# Patient Record
Sex: MALE | Race: BLACK OR AFRICAN AMERICAN | NOT HISPANIC OR LATINO | Employment: PART TIME | URBAN - METROPOLITAN AREA
[De-identification: names, ages, dates, MRNs, and addresses within clinical notes are randomized per-mention and may not be internally consistent; named-entity substitution may affect disease eponyms.]

---

## 2019-06-23 ENCOUNTER — HOSPITAL ENCOUNTER (EMERGENCY)
Facility: HOSPITAL | Age: 24
Discharge: HOME | End: 2019-06-23
Attending: EMERGENCY MEDICINE
Payer: MEDICAID

## 2019-06-23 VITALS
DIASTOLIC BLOOD PRESSURE: 67 MMHG | WEIGHT: 140 LBS | OXYGEN SATURATION: 98 % | TEMPERATURE: 98 F | HEART RATE: 96 BPM | SYSTOLIC BLOOD PRESSURE: 146 MMHG | BODY MASS INDEX: 24.8 KG/M2 | HEIGHT: 63 IN | RESPIRATION RATE: 16 BRPM

## 2019-06-23 DIAGNOSIS — M25.473 PAIN AND SWELLING OF ANKLE: ICD-10-CM

## 2019-06-23 DIAGNOSIS — M25.579 PAIN AND SWELLING OF ANKLE: ICD-10-CM

## 2019-06-23 DIAGNOSIS — S82.65XA CLOSED NONDISPLACED FRACTURE OF LATERAL MALLEOLUS OF LEFT FIBULA, INITIAL ENCOUNTER: Primary | ICD-10-CM

## 2019-06-23 PROCEDURE — 99284 EMERGENCY DEPT VISIT MOD MDM: CPT | Mod: ,,, | Performed by: PHYSICIAN ASSISTANT

## 2019-06-23 PROCEDURE — 99284 PR EMERGENCY DEPT VISIT,LEVEL IV: ICD-10-PCS | Mod: ,,, | Performed by: PHYSICIAN ASSISTANT

## 2019-06-23 PROCEDURE — 99284 EMERGENCY DEPT VISIT MOD MDM: CPT | Mod: 25

## 2019-06-23 PROCEDURE — 25000003 PHARM REV CODE 250: Performed by: PHYSICIAN ASSISTANT

## 2019-06-23 RX ORDER — ACETAMINOPHEN 500 MG
1000 TABLET ORAL
Status: COMPLETED | OUTPATIENT
Start: 2019-06-23 | End: 2019-06-23

## 2019-06-23 RX ORDER — HYDROCODONE BITARTRATE AND ACETAMINOPHEN 5; 325 MG/1; MG/1
1 TABLET ORAL EVERY 4 HOURS PRN
Qty: 12 TABLET | Refills: 0 | Status: SHIPPED | OUTPATIENT
Start: 2019-06-23

## 2019-06-23 RX ADMIN — ACETAMINOPHEN 1000 MG: 500 TABLET ORAL at 06:06

## 2019-06-23 NOTE — ED NOTES
"Patient twisted left ankle while doing trick on [employer] on Wednesday. Patient reports wrapping ankle and taking ibuprofen, with improvement of swelling, but patient reports job is requesting "clearance" from doctor.   "

## 2019-06-23 NOTE — ED NOTES
LOC: The patient is awake, alert and aware of environment with an appropriate affect, the patient is oriented x 3 and speaking appropriately.  APPEARANCE: Patient in no acute distress, patient is clean and well groomed, patient's clothing is properly fastened.  SKIN: The skin is warm and dry, color consistent with ethnicity, abrasion with localized swelling to left ankle  MUSCULOSKELETAL: Patient moving all extremities spontaneously  RESPIRATORY: Airway is open and patent, respirations are spontaneous, patient has a normal effort and rate, no accessory muscle use noted  CARDIAC: Patient has a normal rate, no peripheral edema noted, capillary refill < 3 seconds.  ABDOMEN: no distention noted  NEUROLOGIC: eyes open spontaneously, behavior appropriate to situation, follows commands, facial expression symmetrical purposeful motor response noted

## 2019-06-24 NOTE — ED PROVIDER NOTES
Encounter Date: 6/23/2019       History     Chief Complaint   Patient presents with    Ankle Pain     Injured ankle while skate boarding.      23-year-old  male with no medical history presents to the ED complaining of left ankle pain and swelling. On Wednesday, he was skateboarding when he hit his left ankle on a railing and slid to the ground.  He denies any head trauma or loss of consciousness.  He reports the pain has been improving since onset.  He has been taking over-the-counter ibuprofen and applying ice with some relief of his symptoms. At rest, his pain is 4/10 but is worse with weight-bearing.  He denies any past surgical history of the left foot or ankle.  He denies fever, chills, chest pain, shortness breath, abdominal pain, numbness, paresthesias.    The history is provided by the patient.     Review of patient's allergies indicates:  No Known Allergies  No past medical history on file.  No past surgical history on file.  No family history on file.  Social History     Tobacco Use    Smoking status: Current Some Day Smoker     Types: Cigarettes   Substance Use Topics    Alcohol use: No    Drug use: No     Review of Systems   Constitutional: Negative for chills and fever.   HENT: Negative for congestion, rhinorrhea and sore throat.    Eyes: Negative for photophobia and visual disturbance.   Respiratory: Negative for shortness of breath.    Cardiovascular: Negative for chest pain.   Gastrointestinal: Negative for abdominal pain, constipation, diarrhea, nausea and vomiting.   Genitourinary: Negative for dysuria and hematuria.   Musculoskeletal: Positive for arthralgias, gait problem and joint swelling. Negative for neck pain and neck stiffness.   Skin: Negative for rash.   Neurological: Negative for light-headedness, numbness and headaches.   Psychiatric/Behavioral: Negative for confusion.       Physical Exam     Initial Vitals [06/23/19 1738]   BP Pulse Resp Temp SpO2   (!) 146/67 96  16 98.3 °F (36.8 °C) 98 %      MAP       --         Physical Exam    Nursing note and vitals reviewed.  Constitutional: He appears well-developed and well-nourished. He is not diaphoretic. No distress.   HENT:   Head: Normocephalic and atraumatic.   Neck: Normal range of motion. Neck supple.   Cardiovascular: Normal rate, regular rhythm and normal heart sounds. Exam reveals no gallop and no friction rub.    No murmur heard.  Pulmonary/Chest: Breath sounds normal. He has no wheezes. He has no rhonchi. He has no rales.   Abdominal: Soft. Bowel sounds are normal. There is no tenderness. There is no rebound and no guarding.   Musculoskeletal:        Left ankle: He exhibits decreased range of motion and swelling. He exhibits no deformity, no laceration and normal pulse. Tenderness. Lateral malleolus tenderness found. Achilles tendon exhibits no pain.        Left foot: There is swelling. There is no tenderness and no bony tenderness.   Decreased ROM of the L ankle secondary to pain. Bruising noted to the L ankle. L DP pulse 2+. Normal sensation of the L foot.    Neurological: He is alert and oriented to person, place, and time.   Skin: Skin is warm and dry. No rash noted. No erythema.   Psychiatric: He has a normal mood and affect.         ED Course   Procedures  Labs Reviewed - No data to display       Imaging Results          X-Ray Ankle Complete Left (Final result)  Result time 06/23/19 19:13:14    Final result by Rod Terry MD (06/23/19 19:13:14)                 Impression:      Suspected acute fracture of the lateral malleolar tip, as above.      Electronically signed by: Rod Terry MD  Date:    06/23/2019  Time:    19:13             Narrative:    EXAMINATION:  XR FOOT COMPLETE 3 VIEW LEFT; XR ANKLE COMPLETE 3 VIEW LEFT    CLINICAL HISTORY:  .  Pain in unspecified ankle and joints of unspecified foot    TECHNIQUE:  AP, lateral and oblique views of the left foot and ankle were  performed.    COMPARISON:  None    FINDINGS:  Bones are well mineralized.  Overall alignment is within normal limits.  Lisfranc articulation is congruent.    Suspected tiny mildly displaced avulsion fracture of the lateral malleolar tip.  There is associated overlying soft tissue swelling about the lateral ankle.  Remainder of the ankle mortise is intact.  No dislocation or destructive osseous process.  Joint spaces appear relatively maintained.  No subcutaneous emphysema or radiodense retained foreign body.                               X-Ray Foot Complete Left (Final result)  Result time 06/23/19 19:13:14    Final result by Rod Terry MD (06/23/19 19:13:14)                 Impression:      Suspected acute fracture of the lateral malleolar tip, as above.      Electronically signed by: Rod Terry MD  Date:    06/23/2019  Time:    19:13             Narrative:    EXAMINATION:  XR FOOT COMPLETE 3 VIEW LEFT; XR ANKLE COMPLETE 3 VIEW LEFT    CLINICAL HISTORY:  .  Pain in unspecified ankle and joints of unspecified foot    TECHNIQUE:  AP, lateral and oblique views of the left foot and ankle were performed.    COMPARISON:  None    FINDINGS:  Bones are well mineralized.  Overall alignment is within normal limits.  Lisfranc articulation is congruent.    Suspected tiny mildly displaced avulsion fracture of the lateral malleolar tip.  There is associated overlying soft tissue swelling about the lateral ankle.  Remainder of the ankle mortise is intact.  No dislocation or destructive osseous process.  Joint spaces appear relatively maintained.  No subcutaneous emphysema or radiodense retained foreign body.                                 Medical Decision Making:   History:   Old Medical Records: I decided to obtain old medical records.  Independently Interpreted Test(s):   I have ordered and independently interpreted X-rays - see summary below.       <> Summary of X-Ray Reading(s): Fracture to the distal fibula.  Clinical  Tests:   Radiological Study: Ordered and Reviewed       APC / Resident Notes:   23-year-old  male with no medical history presents to the ED complaining of left ankle pain and swelling. Vital signs stable.  There is swelling noted to the left ankle with bruising to the ankle and foot.  Decreased range of motion of the ankle secondary to pain.  Normal sensation to the left foot.  Left DP pulse 2 +.  Differential diagnosis includes but is not limited to fracture, dislocation, sprain.  Will obtain x-ray of the left foot and ankle for further evaluation.    X-ray shows fracture to the left lateral malleolus.    I do not feel that he needs any further labs or imaging at this time. Stable for discharge.    Placed in ankle air splint. Provided with crutches. He was discharged with a prescription for norco.  He will follow up with orthopedics.  All of the patient's questions were answered.  I reviewed the patient's chart and imaging and discussed the case with my supervising physician.              Attending Attestation:             Attending ED Notes:   I was present available for consultation in the emergency department.  This is not an attestation of this patient was seen and/or examined by me.             Clinical Impression:       ICD-10-CM ICD-9-CM   1. Closed nondisplaced fracture of lateral malleolus of left fibula, initial encounter S82.65XA 824.2   2. Pain and swelling of ankle M25.579 719.47    M25.473 719.07         Disposition:   Disposition: Discharged  Condition: Stable                        Antonette Mariano PA-C  06/23/19 [id number]